# Patient Record
(demographics unavailable — no encounter records)

---

## 2024-10-21 NOTE — CONSULT LETTER
[Courtesy Letter:] : I had the pleasure of seeing your patient, [unfilled], in my office today. [Please see my note below.] : Please see my note below. [Consult Closing:] : Thank you very much for allowing me to participate in the care of this patient.  If you have any questions, please do not hesitate to contact me. [Sincerely,] : Sincerely, [FreeTextEntry2] : Kesha Jimenez MD\par  [address] [place] [address]\[place] [address] [FreeTextEntry3] : Mary Grace Livingston MD, MS Chief, Pediatric Rheumatology [facility]

## 2024-10-21 NOTE — REVIEW OF SYSTEMS
[NI] : Endocrine [Nl] : Hematologic/Lymphatic [Smokers in Home] : there are smokers in the home [FreeTextEntry3] : Post nasal drip [FreeTextEntry2] : See HPI for current status.

## 2024-10-21 NOTE — HISTORY OF PRESENT ILLNESS
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 [Unlimited ADLs] : able to do activities of daily living without limitations [Unlimited Sports] : able to participate in sports without limitations [JIASubtypeDate] : 6/2005 [DurMorningStiffness] : 0 [Ankylosing Spondylitis] : no Ankylosing Spondylitis [Psoriasis] : no Psoriasis [Diabetes Mellitus (type 1 - insulin dependent)] : no Type 1 Diabetes Mellitus [Systemic Lupus Erythematosus] : no Systemic Lupus Erythematosus [Raynaud's Disease] : no Raynaud's Disease [IBD - Crohns] : no Crohn's Inflammatory Bowel disease [IBD - Ulcerative Colitis] : no Ulcerative Colitis Inflammatory Bowel Disease [Graves' Disease] : no Graves' Disease [Hashimoto's Thyroiditis] : no Hashimoto's Thyroiditis [Multiple Sclerosis] : no Multiple Sclerosis [de-identified] : paternal cousin - BLANE; paternal great aunt - RA [FreeTextEntry1] : Oligo-extended BLANE\par  \par  Joint injections - \par      4/18/14 - Rt knee, B/L ankles\par  ...................................................

## 2024-10-21 NOTE — PHYSICAL EXAM
[PERRLA] : WAI [S1, S2 Present] : S1, S2 present [Clear to auscultation] : clear to auscultation [Soft] : soft [NonTender] : non tender [Non Distended] : non distended [Normal Bowel Sounds] : normal bowel sounds [No Hepatosplenomegaly] : no hepatosplenomegaly [No Abnormal Lymph Nodes Palpated] : no abnormal lymph nodes palpated [Refer to Joint Diagram Below] : refer to joint diagram below [Range Of Motion] : full range of motion [Intact Judgement] : intact judgement  [Insight Insight] : intact insight [Hyperextension of knees] : hyperextension of knees [1] : 1 [_______] : Knee: [unfilled]  [Acute distress] : no acute distress [Rash] : no rash [Erythematous Conjunctiva] : nonerythematous conjunctiva [Erythematous Oropharynx] : nonerythematous oropharynx [Lesions] : no lesions [Murmurs] : no murmurs [Joint effusions] : no joint effusions [FreeTextEntry1] : Alert, cheerful, appropriately responsive. [de-identified] : No active arthritis. [NumbJointsActiveArthritis] : 1 [NumbJointsLimitedMotion] : 0 [cJADASscore] : 2 [de-identified] : None

## 2024-10-21 NOTE — IMMUNIZATIONS
[Immunizations are up to date] : Immunizations are up to date [Records maintained by [facility]] : Records maintained by [facility]

## 2024-10-21 NOTE — SOCIAL HISTORY
[Mother] : mother [___ Brothers] : [unfilled] brothers [___ Sisters] : [unfilled] sisters [College] : College [FreeTextEntry1] : [employer]

## 2024-11-25 NOTE — HISTORY OF PRESENT ILLNESS
[de-identified] : 11/25/2024 : ARGELIA LIRA  is a 20 year  old female who presents to the office for follow-up evaluation of her left knee recurrent patellar instability.  She states since the last office visit she has been doing therapy exercises and biking at school and states that she still has pain.  She states when she squats down or kneels down deeply she gets instability episodes.  She states she has had 2 further instability episodes since the last time we have seen her.  She is here for repeat evaluation to discuss options and potential surgery.  She has no other complaints today.  She denies any numbness or tingling distally.  She does have a history of rheumatoid arthritis.  7/29/2024: Argelia is a pleasant 20-year-old female called sooner returns to the office today for evaluation of her left knee recurrent patellar instability.  The patient states that she is feeling better since her last appointment.  She denies any recurrent patellar instability episodes.  She recently had an MRI and comes in to discuss results and any further recommendations.  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  The patient states that she dislocated her left patella after injury 4 years ago.  Her most recent dislocation was this past Sunday.  Since then she has dislocated over 10 times.  She has not had any treatment for this.  She does see an acupuncturist who has been taping her knee.  She has a history of juvenile rheumatoid arthritis was previously on methotrexate for this.  She is not sure what medication she is on now.  She is here today for specialist opinion.  The patient denies any fevers, chills, sweats, recent illnesses, numbness, tingling, weakness, or pain elsewhere at this time.

## 2024-11-25 NOTE — PHYSICAL EXAM
[de-identified] : General: Awake, alert, no acute distress, Patient was cooperative and appropriate during the examination.  The patient is of normal weight for height and age.  Walks without an antalgic gait.   Left knee Examination: Physical examination of the knee demonstrates normal skin without signs of skin changes or abnormalities. No erythema, warmth, or joint effusion is appreciated.    Physiologic valgus of both knees is noted.  No excessive femoral anteversion or tibial torsion is noted.  Sensation is intact to light touch L2-S1 Palpable DP/PT pulse EHL/FHL/TA/GSC motor function intact  Range of Motion 0-130 degrees  Strength Testing Quadriceps/Hamstring 5/5 Patient is able to perform a straight leg raise without difficulty.  Palpation Not tender to palpation about the distal femur or proximal tibia Moderately tender to palpation about the patellofemoral compartment No palpable defect appreciated in the quadriceps or patellar tendons Not tender to palpation of medial joint line Not tender to palpation of lateral joint line  Special Tests Anterior Drawer negative Posterior Drawer negative Lachman Exam negative No Varus or Valgus Laxity at 0 or 30 degrees of knee flexion Yovani's Test negative for pain or crepitus Active compression of the patella positive for pain Translation of the patella 2+ quadrants limited by apprehension [de-identified] : MRI of the left knee from a [facility] imaging center on 7/15/2024 was reviewed today.  No acute pathology is identified.  Patient's TT-TG distance is 1 cm.  X-rays including 3 views of the left knee were obtained in the office on 6/28/2024 and reviewed the patient.  No acute fracture or dislocation.  No significant arthritis.  Very mild lateral patellar tilt.  [place] Giselle ratio is 1.26.

## 2024-11-25 NOTE — DISCUSSION/SUMMARY
[de-identified] : Assessment: 20-year-old female with left knee recurrent patellar instability  I had a long discussion with the patient regarding the nature of her diagnosis, prognosis, and treatment options.  We discussed the risks of no treatment as well as nonoperative and operative treatment modalities.  Nonoperative treatment would include lifestyle modifications, bracing, and anti-inflammatory medications as needed for pain.  Benefits of nonoperative treatment include avoiding the risks associated with surgery.  The risks of nonoperative treatment include further damage to the patient's articular cartilage with repeated instability episodes.  Given the patient's persistent symptomatic instability and desire to return to athletic activities they would like to consider surgical intervention as a more definitive treatment option at this time.    Operative intervention would include a left knee MPFL reconstruction using hamstring allograft and diagnostic knee arthroscopy.  We discussed the various types of autograft and allograft options and the risks and benefits associated with each.  We discussed the risks and benefits associated with the use of allograft tissue in detail including disease transmission. We discussed the risks and benefits of the surgery in detail.  The benefits of surgery include re-stabilizing the patella and protecting the articular cartilage from further damage.  The risks of surgery include but are not limited to infection, blood loss, blood clots, neurovascular injury, stiffness/loss of range of motion, fracture, recurrent instability, persistent pain, painful hardware, progressive arthritis, failure of the graft to heal, re-rupture of the graft, and the need for revision surgery in the future.  I explained to the patient that post-operatively they should expect to be in a knee brace for approximately 4-6 weeks. In the absence of a meniscal repair, the patient will begin weightbearing as tolerated on crutches for 2 weeks after surgery. In the setting of a meniscal repair weightbearing will be restricted for up to 6 weeks after surgery. Physical therapy is expected to start 1 week after surgery although the patient will need to begin range of motion exercises immediately to prevent long-term stiffness. They are expected to actively participate with physical therapy for a minimum of 6 months in order to optimize their surgical outcome. Clearance to return to full activities and/or sports will be determined on an individual basis. On average, most patients will be able to return at 6 months post-op, but some may require more time. Failure to comply with post-operative restrictions and/or failure to participate in physical therapy can lead to surgical failure. The patient verbalizes their understanding of all surgical risks as well as the anticipated post-operative course and would like to proceed with operative intervention.   At this point repeat MRI is indicated due to two recent recurrent dislocation events of her patella that resulted in worsening pain, swelling, and subjective clicking sensations in her kneecap.  This is ordered today.  She will call me once it is done and we will discuss results over the phone.  She may speak with my surgical coordinator now regarding presurgical testing, medical clearance, and scheduling the surgery for her winter break.  They were agreeable to this treatment plan and all of the patient's questions were answered to their satisfaction.  I, Dr. Jang, personally performed the evaluation and management (E/M) services for this established patient who presents today with (a) new problem(s)/exacerbation of (an) existing condition(s).  That E/M includes conducting the clinically appropriate interval history &/or exam, assessing all new/exacerbated conditions, and establishing a new plan of care.  Today, my JOSE, was here to observe my evaluation and management service for this new problem/exacerbated condition and follow the plan of care established by me going forward.

## 2024-12-03 NOTE — DISCUSSION/SUMMARY
[de-identified] : Assessment: 20-year-old female with left knee recurrent patellar instability  I had a long discussion with the patient regarding the nature of her diagnosis, prognosis, and treatment options.  I reviewed the patient's MRI which demonstrates shoulder lateral Hoffa's fat pad edema which in the appropriate clinical setting may represent patellar instability.  I discussed the risks of no treatment as well as nonoperative and operative treatment modalities.  Nonoperative treatment would include lifestyle modifications, bracing, and anti-inflammatory medications as needed for pain.  Benefits of nonoperative treatment include avoiding the risks associated with surgery.  The risks of nonoperative treatment include further damage to the patient's articular cartilage with repeated instability episodes.  Given the patient's persistent symptomatic instability and desire to return to athletic activities they would like to consider surgical intervention as a more definitive treatment option at this time.    Operative intervention would include a left knee MPFL reconstruction using hamstring allograft and diagnostic knee arthroscopy.  We discussed the various types of autograft and allograft options and the risks and benefits associated with each.  We discussed the risks and benefits associated with the use of allograft tissue in detail including disease transmission. We discussed the risks and benefits of the surgery in detail.  The benefits of surgery include re-stabilizing the patella and protecting the articular cartilage from further damage.  The risks of surgery include but are not limited to infection, blood loss, blood clots, neurovascular injury, stiffness/loss of range of motion, fracture, recurrent instability, persistent pain, painful hardware, progressive arthritis, failure of the graft to heal, re-rupture of the graft, and the need for revision surgery in the future.  I explained to the patient that post-operatively they should expect to be in a knee brace for approximately 4-6 weeks. In the absence of a meniscal repair, the patient will begin weightbearing as tolerated on crutches for 2 weeks after surgery. In the setting of a meniscal repair weightbearing will be restricted for up to 6 weeks after surgery. Physical therapy is expected to start 1 week after surgery although the patient will need to begin range of motion exercises immediately to prevent long-term stiffness. They are expected to actively participate with physical therapy for a minimum of 6 months in order to optimize their surgical outcome. Clearance to return to full activities and/or sports will be determined on an individual basis. On average, most patients will be able to return at 6 months post-op, but some may require more time. Failure to comply with post-operative restrictions and/or failure to participate in physical therapy can lead to surgical failure. The patient verbalizes their understanding of all surgical risks as well as the anticipated post-operative course and would like to proceed with operative intervention.   She may speak with my surgical coordinator now regarding presurgical testing, medical clearance, and scheduling the surgery for her winter break.  They were agreeable to this treatment plan and all of the patient's questions were answered to their satisfaction.  I, Dr. Jang, personally performed the evaluation and management (E/M) services for this established patient who presents today with (a) new problem(s)/exacerbation of (an) existing condition(s).  That E/M includes conducting the clinically appropriate interval history &/or exam, assessing all new/exacerbated conditions, and establishing a new plan of care.  Today, my JOSE, was here to observe my evaluation and management service for this new problem/exacerbated condition and follow the plan of care established by me going forward.

## 2024-12-03 NOTE — HISTORY OF PRESENT ILLNESS
[de-identified] : 12/3/2024: Andi is a 20-year-old female returns to the office today for follow-up evaluation of her left knee pain and recurrent instability.  The patient states her symptoms are unchanged since her last appointment.  She recently had repeat MRI and comes in to discuss results and any further recommendations.  The patient denies any fevers, chills, sweats, recent illnesses, numbness, tingling, weakness, or pain elsewhere at this time.  She is here today with her mother.  11/25/2024 : ANDI LIRA  is a 20 year  old female who presents to the office for follow-up evaluation of her left knee recurrent patellar instability.  She states since the last office visit she has been doing therapy exercises and biking at school and states that she still has pain.  She states when she squats down or kneels down deeply she gets instability episodes.  She states she has had 2 further instability episodes since the last time we have seen her.  She is here for repeat evaluation to discuss options and potential surgery.  She has no other complaints today.  She denies any numbness or tingling distally.  She does have a history of rheumatoid arthritis.  7/29/2024: Andi is a pleasant 20-year-old female called sooner returns to the office today for evaluation of her left knee recurrent patellar instability.  The patient states that she is feeling better since her last appointment.  She denies any recurrent patellar instability episodes.  She recently had an MRI and comes in to discuss results and any further recommendations.  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  The patient states that she dislocated her left patella after injury 4 years ago.  Her most recent dislocation was this past Sunday.  Since then she has dislocated over 10 times.  She has not had any treatment for this.  She does see an acupuncturist who has been taping her knee.  She has a history of juvenile rheumatoid arthritis was previously on methotrexate for this.  She is not sure what medication she is on now.  She is here today for specialist opinion.  The patient denies any fevers, chills, sweats, recent illnesses, numbness, tingling, weakness, or pain elsewhere at this time.

## 2024-12-03 NOTE — PHYSICAL EXAM
[de-identified] : General: Awake, alert, no acute distress, Patient was cooperative and appropriate during the examination.  The patient is of normal weight for height and age.  Walks without an antalgic gait.   Left knee Examination: Physical examination of the knee demonstrates normal skin without signs of skin changes or abnormalities. No erythema, warmth, or joint effusion is appreciated.    Physiologic valgus of both knees is noted.  No excessive femoral anteversion or tibial torsion is noted.  Sensation is intact to light touch L2-S1 Palpable DP/PT pulse EHL/FHL/TA/GSC motor function intact  Range of Motion 0-130 degrees  Strength Testing Quadriceps/Hamstring 5/5 Patient is able to perform a straight leg raise without difficulty.  Palpation Not tender to palpation about the distal femur or proximal tibia Moderately tender to palpation about the patellofemoral compartment No palpable defect appreciated in the quadriceps or patellar tendons Not tender to palpation of medial joint line Not tender to palpation of lateral joint line  Special Tests Anterior Drawer negative Posterior Drawer negative Lachman Exam negative No Varus or Valgus Laxity at 0 or 30 degrees of knee flexion Yovani's Test negative for pain or crepitus Active compression of the patella positive for pain Translation of the patella 2+ quadrants limited by apprehension [de-identified] : MRI of the left knee.  [facility] on 12/1/[facility]24 was reviewed with patient today in the office: -Minimal superior-lateral Hoffa's fat edema, which may represent patellar instability in the appropriate clinical setting.  MRI of the left knee from a [facility] center on 7/15/2024 was reviewed today.  No acute pathology is identified.  Patient's TT-TG distance is 1 cm.  X-rays including 3 views of the left knee were obtained in the office on 6/28/2024 and reviewed the patient.  No acute fracture or dislocation.  No significant arthritis.  Very mild lateral patellar tilt.  [place] Giselle ratio is 1.26.

## 2024-12-23 NOTE — HISTORY OF PRESENT ILLNESS
[___ Weeks Post Op] : [unfilled] weeks post op [de-identified] : DOS: 12/12/2024 s/p left knee MPFL reconstruction with hamstring allograft and diagnostic arthroscopy [de-identified] : Argelia is a 20-year-old female returns to the office today status post left knee MPFL reconstruction.  The patient states that she is doing better and has had improvements in her pain and swelling since the time of surgery.  She has been compliant with her restrictions.  She is bearing weight with use of her brace and crutches.  She has not started physical therapy yet.  She is here today for routine follow-up.  She denies any fevers, chills, sweats, or pain elsewhere. [de-identified] : On exam, the patient is pleasant.  They  are awake, alert, and oriented x3.  The patient walks with her brace and on crutches.   Left lower Extremity The patient's incision (s) well-approximated and healing well.  The incisions were cleaned with alcohol and Steri-Strips were applied.  The patient tolerated this well. No erythema, warmth, or drainage. There is a mild postoperative effusion.  No bony tenderness to palpation about the knee. Range of motion: 0 to 30 degrees.  No apprehension with lateral translation of the patella.  Anterior drawer negative, Posterior Drawer negative, Lachman negative. No varus or valgus laxity at 0 or 30 degrees of knee flexion.  EHL/FHL/TA/GSC motor function is intact, sensations intact light touch in L2-S1 distributions, DP/PT pulses are palpable, compartments are soft and compressible, there is no calf tenderness to palpation bilaterally. [de-identified] : Status post left knee MPFL reconstruction using hamstring allograft and diagnostic arthroscopy on 12/12/2024 [de-identified] : Argelia is recovering well from her surgery.  She has had improvements in pain and swelling since the time of surgery.  At this point she may progress to weight-bear as tolerated and wean off the crutches over the next 1 to 2 weeks.  She will continue to ambulate with the brace locked in extension.  She will begin gentle progressive range of motion exercises with physical therapy with a goal of achieving but not exceeding 90 degrees of knee flexion by 6 weeks postop.  She may continue with Tylenol or Motrin as needed for pain.  She will avoid any high impact activities or sports at this time.  She will follow-up in 4 weeks for repeat clinical assessment.  The patient and her father verbalized understanding and agree with the plan.  All questions answered today satisfaction

## 2025-01-24 NOTE — HISTORY OF PRESENT ILLNESS
[___ Weeks Post Op] : [unfilled] weeks post op [de-identified] : DOS: 12/12/2024 s/p left knee MPFL reconstruction with hamstring allograft and diagnostic arthroscopy [de-identified] : Argelia is a 20-year-old female returns to the office today status post left knee MPFL reconstruction.  The patient states she has been compliant with her restrictions and her range of motion and pain is improving.  She states she has gotten her knee to 90 degrees with physical therapy however with pain.  She denies any recurrent instability episodes.  She is here for specialist follow-up.  She denies any numbness or tingling distally.  She has no other complaints today.  She is here with her father today. [de-identified] : On exam, the patient is pleasant.  They  are awake, alert, and oriented x3.  The patient walks with her brace and on crutches.   Left lower Extremity The patient's incisions are well-healed and benign appearance.. There is a mild postoperative effusion.  No bony tenderness to palpation about the knee. Range of motion: 0 to 75 degrees.  No apprehension with lateral translation of the patella.  Anterior drawer negative, Posterior Drawer negative, Lachman negative. No varus or valgus laxity at 0 or 30 degrees of knee flexion.  EHL/FHL/TA/GSC motor function is intact, sensations intact light touch in L2-S1 distributions, DP/PT pulses are palpable, compartments are soft and compressible, there is no calf tenderness to palpation bilaterally. [de-identified] : Status post left knee MPFL reconstruction using hamstring allograft and diagnostic arthroscopy on 12/12/2024 [de-identified] : Argelia is recovering well from her surgery.  She has had improvements in pain and swelling since the time of surgery.  At this time she will continue with physical therapy both formally on her own to progress her range of motion and her weightbearing status.  She can weight-bear as tolerated with a knee brace unlocked and gradually wean from the crutches and then subsequently the brace with the guidance of physical therapy.  She will avoid gym and sport related activities and will avoid high-impact activities and will continue to follow the postoperative protocol previously provided for gentle strengthening.  She will follow-up in 6 weeks for repeat evaluation to reassess.  Patient understands and agrees and all questions were answered.   I, Dr. Jang, personally performed the evaluation and management (E/M) services for this established patient who presents today with (a) new problem(s)/exacerbation of (an) existing condition(s).  That E/M includes conducting the clinically appropriate interval history &/or exam, assessing all new/exacerbated conditions, and establishing a new plan of care.  Today, my JOSE, was here to observe my evaluation and management service for this new problem/exacerbated condition and follow the plan of care established by me going forward.

## 2025-02-03 NOTE — HISTORY OF PRESENT ILLNESS
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 [Unlimited ADLs] : able to do activities of daily living without limitations [Unlimited Sports] : able to participate in sports without limitations [0.5] : 0.5 [JIASubtypeDate] : 6/2005 [DurMorningStiffness] : 0 [Ankylosing Spondylitis] : no Ankylosing Spondylitis [Psoriasis] : no Psoriasis [Diabetes Mellitus (type 1 - insulin dependent)] : no Type 1 Diabetes Mellitus [Systemic Lupus Erythematosus] : no Systemic Lupus Erythematosus [Raynaud's Disease] : no Raynaud's Disease [IBD - Crohns] : no Crohn's Inflammatory Bowel disease [IBD - Ulcerative Colitis] : no Ulcerative Colitis Inflammatory Bowel Disease [Graves' Disease] : no Graves' Disease [Hashimoto's Thyroiditis] : no Hashimoto's Thyroiditis [Multiple Sclerosis] : no Multiple Sclerosis [de-identified] : paternal cousin - BLANE; paternal great aunt - RA [FreeTextEntry1] : Oligo-extended BLANE\par  \par  Joint injections - \par      4/18/14 - Rt knee, B/L ankles\par  ...................................................

## 2025-02-03 NOTE — CONSULT LETTER
[Courtesy Letter:] : I had the pleasure of seeing your patient, [unfilled], in my office today. [Please see my note below.] : Please see my note below. [Consult Closing:] : Thank you very much for allowing me to participate in the care of this patient.  If you have any questions, please do not hesitate to contact me. [Sincerely,] : Sincerely, [FreeTextEntry2] : Kesha Jimenez MD\par  [address] [place] [address]\[place] [address] [FreeTextEntry3] : Mary Grace Livingston MD, MS Chief, Pediatric Rheumatology [facility]

## 2025-02-03 NOTE — PHYSICAL EXAM
[PERRLA] : WAI [S1, S2 Present] : S1, S2 present [Clear to auscultation] : clear to auscultation [Soft] : soft [NonTender] : non tender [Non Distended] : non distended [Normal Bowel Sounds] : normal bowel sounds [No Hepatosplenomegaly] : no hepatosplenomegaly [No Abnormal Lymph Nodes Palpated] : no abnormal lymph nodes palpated [Refer to Joint Diagram Below] : refer to joint diagram below [Range Of Motion] : full range of motion [Intact Judgement] : intact judgement  [Insight Insight] : intact insight [1] : 1 [_______] : Knee: [unfilled]  [Hyperextension of knees] : hyperextension of knees [Acute distress] : no acute distress [Rash] : no rash [Erythematous Conjunctiva] : nonerythematous conjunctiva [Erythematous Oropharynx] : nonerythematous oropharynx [Lesions] : no lesions [Murmurs] : no murmurs [Joint effusions] : no joint effusions [FreeTextEntry1] : Alert, cheerful, appropriately responsive. [de-identified] : No active arthritis. [NumbJointsActiveArthritis] : 1 [NumbJointsLimitedMotion] : 0 [cJADASscore] : 2 [de-identified] : None

## 2025-03-10 NOTE — HISTORY OF PRESENT ILLNESS
[___ Weeks Post Op] : [unfilled] weeks post op [de-identified] : DOS: 12/12/2024 s/p left knee MPFL reconstruction with hamstring allograft and diagnostic arthroscopy [de-identified] : Argelia is a 21-year-old female returns to the office today status post left knee MPFL reconstruction.  The patient states that she has been doing very well and has had improvements in her range of motion since her last appointment.  She has been compliant with her restrictions and going to physical therapy.  Unfortunately, she had to take the rest of the semester off due to medical leave but is pleased with her progress.  She is here today for routine follow-up.  She denies any fevers, chills, sweats, or pain elsewhere. [de-identified] : On exam, the patient is pleasant.  They  are awake, alert, and oriented x3.  The patient walks with no assistive devices.   Left lower Extremity The patient's incision (s) well-healed. No erythema, warmth, or drainage. There is no effusion.  No bony tenderness to palpation about the knee. Range of motion: 0 to 130 degrees, symmetric with contralateral side.  Lateral translation of the patella 1+ quadrant with no apprehension or pain.  Anterior drawer negative, Posterior Drawer negative, Lachman negative. No varus or valgus laxity at 0 or 30 degrees of knee flexion.  EHL/FHL/TA/GSC motor function is intact, sensations intact light touch in L2-S1 distributions, DP/PT pulses are palpable, compartments are soft and compressible, there is no calf tenderness to palpation bilaterally. [de-identified] : 21-year-old female status post left knee MPFL reconstruction using hamstring allograft and diagnostic arthroscopy on 12/12/2024 [de-identified] : Argelia is recovering very well from her surgery.  She has had substantial improvements in her range of motion and discomfort since her last appointment.  At this time she may continue to ambulate without any assistive devices.  She will continue with physical therapy now focusing on strengthening and conditioning exercises and a new referral was provided today.  She will avoid any running, jumping, or sports.  She will follow-up in 3 months for repeat clinical assessment.  The patient verbalizes her understanding and agrees with the plan.  All questions were answered her satisfaction.  I, Dr. Jang, personally performed the evaluation and management (E/M) services for this established patient who presents today with (a) new problem(s)/exacerbation of (an) existing condition(s).  That E/M includes conducting the clinically appropriate interval history &/or exam, assessing all new/exacerbated conditions, and establishing a new plan of care.  Today, my JOSE, was here to observe my evaluation and management service for this new problem/exacerbated condition and follow the plan of care established by me going forward.

## 2025-04-08 NOTE — CONSULT LETTER
[Courtesy Letter:] : I had the pleasure of seeing your patient, [unfilled], in my office today. [Please see my note below.] : Please see my note below. [Consult Closing:] : Thank you very much for allowing me to participate in the care of this patient.  If you have any questions, please do not hesitate to contact me. [Sincerely,] : Sincerely, [FreeTextEntry2] : Kesha Jimenez MD\par  [address] [place] [address]\[place] [address] [FreeTextEntry3] : Mary Grace Livingston MD, MS Chief, Pediatric Rheumatology [facility]

## 2025-04-08 NOTE — CONSULT LETTER
[Courtesy Letter:] : I had the pleasure of seeing your patient, [unfilled], in my office today. [Please see my note below.] : Please see my note below. [Consult Closing:] : Thank you very much for allowing me to participate in the care of this patient.  If you have any questions, please do not hesitate to contact me. [Sincerely,] : Sincerely, [FreeTextEntry2] : Kesha Jimenez MD\par  [address] [place] [address]\[place] [address] [FreeTextEntry3] : Mary Grace Livingston MD, MS Chief, Pediatric Rheumatology [facility]

## 2025-04-08 NOTE — PHYSICAL EXAM
[PERRLA] : WAI [S1, S2 Present] : S1, S2 present [Clear to auscultation] : clear to auscultation [Soft] : soft [NonTender] : non tender [Non Distended] : non distended [Normal Bowel Sounds] : normal bowel sounds [No Hepatosplenomegaly] : no hepatosplenomegaly [No Abnormal Lymph Nodes Palpated] : no abnormal lymph nodes palpated [Refer to Joint Diagram Below] : refer to joint diagram below [Range Of Motion] : full range of motion [Intact Judgement] : intact judgement  [Insight Insight] : intact insight [1] : 1 [Hyperextension of knees] : hyperextension of knees [2] : 2 [_______] : Knee: [unfilled] [Acute distress] : no acute distress [Rash] : no rash [Erythematous Conjunctiva] : nonerythematous conjunctiva [Erythematous Oropharynx] : nonerythematous oropharynx [Lesions] : no lesions [Murmurs] : no murmurs [Joint effusions] : no joint effusions [FreeTextEntry1] : Alert, cheerful, appropriately responsive. [de-identified] : No active arthritis. [NumbJointsActiveArthritis] : 2 [NumbJointsLimitedMotion] : 2 [ADASscore] : 4 [de-identified] : None

## 2025-04-08 NOTE — HISTORY OF PRESENT ILLNESS
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 [Unlimited ADLs] : able to do activities of daily living without limitations [Unlimited Sports] : able to participate in sports without limitations [JIASubtypeDate] : 6/2005 [DurMorningStiffness] : 0 [Ankylosing Spondylitis] : no Ankylosing Spondylitis [Psoriasis] : no Psoriasis [Diabetes Mellitus (type 1 - insulin dependent)] : no Type 1 Diabetes Mellitus [Systemic Lupus Erythematosus] : no Systemic Lupus Erythematosus [Raynaud's Disease] : no Raynaud's Disease [IBD - Crohns] : no Crohn's Inflammatory Bowel disease [IBD - Ulcerative Colitis] : no Ulcerative Colitis Inflammatory Bowel Disease [Graves' Disease] : no Graves' Disease [Hashimoto's Thyroiditis] : no Hashimoto's Thyroiditis [Multiple Sclerosis] : no Multiple Sclerosis [de-identified] : paternal cousin - BLANE; paternal great aunt - RA [FreeTextEntry1] : Oligo-extended BLANE\par  \par  Joint injections - \par      4/18/14 - Rt knee, B/L ankles\par  ...................................................

## 2025-04-08 NOTE — CONSULT LETTER
[Courtesy Letter:] : I had the pleasure of seeing your patient, [unfilled], in my office today. [Please see my note below.] : Please see my note below. [Consult Closing:] : Thank you very much for allowing me to participate in the care of this patient.  If you have any questions, please do not hesitate to contact me. [Sincerely,] : Sincerely, [FreeTextEntry2] : Kesha Jimenez MD\par  [address] [place] [address]\[place] [address] [FreeTextEntry3] : Mary Grace Livingston MD, MS Chief, Pediatric Rheumatology [facility]

## 2025-04-08 NOTE — HISTORY OF PRESENT ILLNESS
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 [Unlimited ADLs] : able to do activities of daily living without limitations [Unlimited Sports] : able to participate in sports without limitations [JIASubtypeDate] : 6/2005 [DurMorningStiffness] : 0 [Ankylosing Spondylitis] : no Ankylosing Spondylitis [Psoriasis] : no Psoriasis [Diabetes Mellitus (type 1 - insulin dependent)] : no Type 1 Diabetes Mellitus [Systemic Lupus Erythematosus] : no Systemic Lupus Erythematosus [Raynaud's Disease] : no Raynaud's Disease [IBD - Crohns] : no Crohn's Inflammatory Bowel disease [IBD - Ulcerative Colitis] : no Ulcerative Colitis Inflammatory Bowel Disease [Graves' Disease] : no Graves' Disease [Hashimoto's Thyroiditis] : no Hashimoto's Thyroiditis [Multiple Sclerosis] : no Multiple Sclerosis [de-identified] : paternal cousin - BLANE; paternal great aunt - RA [FreeTextEntry1] : Oligo-extended BLANE\par  \par  Joint injections - \par      4/18/14 - Rt knee, B/L ankles\par  ...................................................

## 2025-04-08 NOTE — PHYSICAL EXAM
[PERRLA] : WAI [S1, S2 Present] : S1, S2 present [Clear to auscultation] : clear to auscultation [Soft] : soft [NonTender] : non tender [Non Distended] : non distended [Normal Bowel Sounds] : normal bowel sounds [No Hepatosplenomegaly] : no hepatosplenomegaly [No Abnormal Lymph Nodes Palpated] : no abnormal lymph nodes palpated [Refer to Joint Diagram Below] : refer to joint diagram below [Range Of Motion] : full range of motion [Intact Judgement] : intact judgement  [Insight Insight] : intact insight [1] : 1 [Hyperextension of knees] : hyperextension of knees [2] : 2 [_______] : Knee: [unfilled] [Acute distress] : no acute distress [Rash] : no rash [Erythematous Conjunctiva] : nonerythematous conjunctiva [Erythematous Oropharynx] : nonerythematous oropharynx [Lesions] : no lesions [Murmurs] : no murmurs [Joint effusions] : no joint effusions [FreeTextEntry1] : Alert, cheerful, appropriately responsive. [de-identified] : No active arthritis. [NumbJointsActiveArthritis] : 2 [NumbJointsLimitedMotion] : 2 [ADASscore] : 4 [de-identified] : None

## 2025-04-08 NOTE — HISTORY OF PRESENT ILLNESS
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 [Unlimited ADLs] : able to do activities of daily living without limitations [Unlimited Sports] : able to participate in sports without limitations [JIASubtypeDate] : 6/2005 [DurMorningStiffness] : 0 [Ankylosing Spondylitis] : no Ankylosing Spondylitis [Psoriasis] : no Psoriasis [Diabetes Mellitus (type 1 - insulin dependent)] : no Type 1 Diabetes Mellitus [Systemic Lupus Erythematosus] : no Systemic Lupus Erythematosus [Raynaud's Disease] : no Raynaud's Disease [IBD - Crohns] : no Crohn's Inflammatory Bowel disease [IBD - Ulcerative Colitis] : no Ulcerative Colitis Inflammatory Bowel Disease [Graves' Disease] : no Graves' Disease [Hashimoto's Thyroiditis] : no Hashimoto's Thyroiditis [Multiple Sclerosis] : no Multiple Sclerosis [de-identified] : paternal cousin - BLANE; paternal great aunt - RA [FreeTextEntry1] : Oligo-extended BLANE\par  \par  Joint injections - \par      4/18/14 - Rt knee, B/L ankles\par  ...................................................

## 2025-04-08 NOTE — PHYSICAL EXAM
[PERRLA] : WAI [S1, S2 Present] : S1, S2 present [Clear to auscultation] : clear to auscultation [Soft] : soft [NonTender] : non tender [Non Distended] : non distended [Normal Bowel Sounds] : normal bowel sounds [No Hepatosplenomegaly] : no hepatosplenomegaly [No Abnormal Lymph Nodes Palpated] : no abnormal lymph nodes palpated [Refer to Joint Diagram Below] : refer to joint diagram below [Range Of Motion] : full range of motion [Intact Judgement] : intact judgement  [Insight Insight] : intact insight [1] : 1 [Hyperextension of knees] : hyperextension of knees [2] : 2 [_______] : Knee: [unfilled] [Acute distress] : no acute distress [Rash] : no rash [Erythematous Conjunctiva] : nonerythematous conjunctiva [Erythematous Oropharynx] : nonerythematous oropharynx [Lesions] : no lesions [Murmurs] : no murmurs [Joint effusions] : no joint effusions [FreeTextEntry1] : Alert, cheerful, appropriately responsive. [de-identified] : No active arthritis. [NumbJointsActiveArthritis] : 2 [NumbJointsLimitedMotion] : 2 [ADASscore] : 4 [de-identified] : None

## 2025-05-21 NOTE — HISTORY OF PRESENT ILLNESS
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 [Unlimited ADLs] : able to do activities of daily living without limitations [Unlimited Sports] : able to participate in sports without limitations [JIASubtypeDate] : 6/2005 [DurMorningStiffness] : 0 [Ankylosing Spondylitis] : no Ankylosing Spondylitis [Psoriasis] : no Psoriasis [Diabetes Mellitus (type 1 - insulin dependent)] : no Type 1 Diabetes Mellitus [Systemic Lupus Erythematosus] : no Systemic Lupus Erythematosus [Raynaud's Disease] : no Raynaud's Disease [IBD - Crohns] : no Crohn's Inflammatory Bowel disease [IBD - Ulcerative Colitis] : no Ulcerative Colitis Inflammatory Bowel Disease [Graves' Disease] : no Graves' Disease [Hashimoto's Thyroiditis] : no Hashimoto's Thyroiditis [Multiple Sclerosis] : no Multiple Sclerosis [de-identified] : paternal cousin - BLANE; paternal great aunt - RA [FreeTextEntry1] : Oligo-extended BLANE\par  \par  Joint injections - \par      4/18/14 - Rt knee, B/L ankles\par  ...................................................

## 2025-05-21 NOTE — CONSULT LETTER
[Courtesy Letter:] : I had the pleasure of seeing your patient, [unfilled], in my office today. [Please see my note below.] : Please see my note below. [Consult Closing:] : Thank you very much for allowing me to participate in the care of this patient.  If you have any questions, please do not hesitate to contact me. [Sincerely,] : Sincerely, [FreeTextEntry2] : Kesha Jimenez MD\par  [address] [place] [address]\[place] [address] [FreeTextEntry3] : Mary Grace Livnigston MD, MS Chief, Pediatric Rheumatology [facility]

## 2025-05-21 NOTE — HISTORY OF PRESENT ILLNESS
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 [Unlimited ADLs] : able to do activities of daily living without limitations [Unlimited Sports] : able to participate in sports without limitations [JIASubtypeDate] : 6/2005 [DurMorningStiffness] : 0 [Ankylosing Spondylitis] : no Ankylosing Spondylitis [Psoriasis] : no Psoriasis [Diabetes Mellitus (type 1 - insulin dependent)] : no Type 1 Diabetes Mellitus [Systemic Lupus Erythematosus] : no Systemic Lupus Erythematosus [Raynaud's Disease] : no Raynaud's Disease [IBD - Crohns] : no Crohn's Inflammatory Bowel disease [IBD - Ulcerative Colitis] : no Ulcerative Colitis Inflammatory Bowel Disease [Graves' Disease] : no Graves' Disease [Hashimoto's Thyroiditis] : no Hashimoto's Thyroiditis [Multiple Sclerosis] : no Multiple Sclerosis [de-identified] : paternal cousin - BLANE; paternal great aunt - RA [FreeTextEntry1] : Oligo-extended BLANE\par  \par  Joint injections - \par      4/18/14 - Rt knee, B/L ankles\par  ...................................................

## 2025-05-21 NOTE — PHYSICAL EXAM
[PERRLA] : WAI [S1, S2 Present] : S1, S2 present [Clear to auscultation] : clear to auscultation [Soft] : soft [NonTender] : non tender [Non Distended] : non distended [Normal Bowel Sounds] : normal bowel sounds [No Hepatosplenomegaly] : no hepatosplenomegaly [No Abnormal Lymph Nodes Palpated] : no abnormal lymph nodes palpated [Refer to Joint Diagram Below] : refer to joint diagram below [Range Of Motion] : full range of motion [Intact Judgement] : intact judgement  [Insight Insight] : intact insight [2] : 2 [_______] : Knee: [unfilled] [Hyperextension of knees] : hyperextension of knees [ADASscore] : 4 [Acute distress] : no acute distress [Rash] : no rash [Erythematous Conjunctiva] : nonerythematous conjunctiva [Erythematous Oropharynx] : nonerythematous oropharynx [Lesions] : no lesions [Murmurs] : no murmurs [Joint effusions] : no joint effusions [FreeTextEntry1] : Alert, cheerful, appropriately responsive. [de-identified] : No active arthritis. [NumbJointsActiveArthritis] : 2 [NumbJointsLimitedMotion] : 2 [de-identified] : None

## 2025-05-21 NOTE — CONSULT LETTER
[Courtesy Letter:] : I had the pleasure of seeing your patient, [unfilled], in my office today. [Please see my note below.] : Please see my note below. [Consult Closing:] : Thank you very much for allowing me to participate in the care of this patient.  If you have any questions, please do not hesitate to contact me. [Sincerely,] : Sincerely, [FreeTextEntry2] : Kesha Jimenez MD\par  [address] [place] [address]\[place] [address] [FreeTextEntry3] : Mary Grace Livingsotn MD, MS Chief, Pediatric Rheumatology [facility]

## 2025-05-21 NOTE — PHYSICAL EXAM
[PERRLA] : WAI [S1, S2 Present] : S1, S2 present [Clear to auscultation] : clear to auscultation [Soft] : soft [NonTender] : non tender [Non Distended] : non distended [Normal Bowel Sounds] : normal bowel sounds [No Hepatosplenomegaly] : no hepatosplenomegaly [No Abnormal Lymph Nodes Palpated] : no abnormal lymph nodes palpated [Refer to Joint Diagram Below] : refer to joint diagram below [Range Of Motion] : full range of motion [Intact Judgement] : intact judgement  [Insight Insight] : intact insight [2] : 2 [_______] : Knee: [unfilled] [Hyperextension of knees] : hyperextension of knees [ADASscore] : 4 [Acute distress] : no acute distress [Rash] : no rash [Erythematous Conjunctiva] : nonerythematous conjunctiva [Erythematous Oropharynx] : nonerythematous oropharynx [Lesions] : no lesions [Murmurs] : no murmurs [Joint effusions] : no joint effusions [FreeTextEntry1] : Alert, cheerful, appropriately responsive. [de-identified] : No active arthritis. [NumbJointsActiveArthritis] : 2 [NumbJointsLimitedMotion] : 2 [de-identified] : None

## 2025-07-16 NOTE — HISTORY OF PRESENT ILLNESS
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 [Unlimited ADLs] : able to do activities of daily living without limitations [Unlimited Sports] : able to participate in sports without limitations [JIASubtypeDate] : 6/2005 [DurMorningStiffness] : 0 [Ankylosing Spondylitis] : no Ankylosing Spondylitis [Psoriasis] : no Psoriasis [Diabetes Mellitus (type 1 - insulin dependent)] : no Type 1 Diabetes Mellitus [Systemic Lupus Erythematosus] : no Systemic Lupus Erythematosus [Raynaud's Disease] : no Raynaud's Disease [IBD - Crohns] : no Crohn's Inflammatory Bowel disease [IBD - Ulcerative Colitis] : no Ulcerative Colitis Inflammatory Bowel Disease [Graves' Disease] : no Graves' Disease [Hashimoto's Thyroiditis] : no Hashimoto's Thyroiditis [Multiple Sclerosis] : no Multiple Sclerosis [de-identified] : paternal cousin - BLANE; paternal great aunt - RA [FreeTextEntry1] : Oligo-extended BLANE\par  \par  Joint injections - \par      4/18/14 - Rt knee, B/L ankles\par  ...................................................

## 2025-07-16 NOTE — CONSULT LETTER
[Courtesy Letter:] : I had the pleasure of seeing your patient, [unfilled], in my office today. [Please see my note below.] : Please see my note below. [Consult Closing:] : Thank you very much for allowing me to participate in the care of this patient.  If you have any questions, please do not hesitate to contact me. [Sincerely,] : Sincerely, [FreeTextEntry2] : Kesha Jimenez MD\par  [address] [place] [address]\[place] [address] [FreeTextEntry3] : Mary Grace Livingston MD, MS Chief, Pediatric Rheumatology [facility]

## 2025-07-16 NOTE — PHYSICAL EXAM
[PERRLA] : WAI [S1, S2 Present] : S1, S2 present [Clear to auscultation] : clear to auscultation [Soft] : soft [NonTender] : non tender [Non Distended] : non distended [Normal Bowel Sounds] : normal bowel sounds [No Hepatosplenomegaly] : no hepatosplenomegaly [No Abnormal Lymph Nodes Palpated] : no abnormal lymph nodes palpated [Refer to Joint Diagram Below] : refer to joint diagram below [Range Of Motion] : full range of motion [Intact Judgement] : intact judgement  [Insight Insight] : intact insight [2] : 2 [_______] : Knee: [unfilled] [Hyperextension of knees] : hyperextension of knees [Acute distress] : no acute distress [Rash] : no rash [Erythematous Conjunctiva] : nonerythematous conjunctiva [Erythematous Oropharynx] : nonerythematous oropharynx [Lesions] : no lesions [Murmurs] : no murmurs [Joint effusions] : no joint effusions [FreeTextEntry1] : Alert, cheerful, appropriately responsive. [de-identified] : No active arthritis. [NumbJointsActiveArthritis] : 1 [NumbJointsLimitedMotion] : 1 [de-identified] : None

## 2025-07-16 NOTE — HISTORY OF PRESENT ILLNESS
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 [Unlimited ADLs] : able to do activities of daily living without limitations [Unlimited Sports] : able to participate in sports without limitations [JIASubtypeDate] : 6/2005 [DurMorningStiffness] : 0 [Ankylosing Spondylitis] : no Ankylosing Spondylitis [Psoriasis] : no Psoriasis [Diabetes Mellitus (type 1 - insulin dependent)] : no Type 1 Diabetes Mellitus [Systemic Lupus Erythematosus] : no Systemic Lupus Erythematosus [Raynaud's Disease] : no Raynaud's Disease [IBD - Crohns] : no Crohn's Inflammatory Bowel disease [IBD - Ulcerative Colitis] : no Ulcerative Colitis Inflammatory Bowel Disease [Graves' Disease] : no Graves' Disease [Hashimoto's Thyroiditis] : no Hashimoto's Thyroiditis [Multiple Sclerosis] : no Multiple Sclerosis [de-identified] : paternal cousin - BLANE; paternal great aunt - RA [FreeTextEntry1] : Oligo-extended BLANE\par  \par  Joint injections - \par      4/18/14 - Rt knee, B/L ankles\par  ...................................................

## 2025-07-16 NOTE — CONSULT LETTER
[Courtesy Letter:] : I had the pleasure of seeing your patient, [unfilled], in my office today. [Please see my note below.] : Please see my note below. [Consult Closing:] : Thank you very much for allowing me to participate in the care of this patient.  If you have any questions, please do not hesitate to contact me. [Sincerely,] : Sincerely, [FreeTextEntry2] : Kesha Jimenez MD\par  [address] [place] [address]\[place] [address] [FreeTextEntry3] : Mary Grace Livingston MD, MS Chief, Pediatric Rheumatology [facility]

## 2025-07-16 NOTE — PHYSICAL EXAM
[PERRLA] : WAI [S1, S2 Present] : S1, S2 present [Clear to auscultation] : clear to auscultation [Soft] : soft [NonTender] : non tender [Non Distended] : non distended [Normal Bowel Sounds] : normal bowel sounds [No Hepatosplenomegaly] : no hepatosplenomegaly [No Abnormal Lymph Nodes Palpated] : no abnormal lymph nodes palpated [Refer to Joint Diagram Below] : refer to joint diagram below [Range Of Motion] : full range of motion [Intact Judgement] : intact judgement  [Insight Insight] : intact insight [2] : 2 [_______] : Knee: [unfilled] [Hyperextension of knees] : hyperextension of knees [Acute distress] : no acute distress [Rash] : no rash [Erythematous Conjunctiva] : nonerythematous conjunctiva [Erythematous Oropharynx] : nonerythematous oropharynx [Lesions] : no lesions [Murmurs] : no murmurs [Joint effusions] : no joint effusions [FreeTextEntry1] : Alert, cheerful, appropriately responsive. [de-identified] : No active arthritis. [NumbJointsActiveArthritis] : 1 [NumbJointsLimitedMotion] : 1 [de-identified] : None

## 2025-07-28 NOTE — PROCEDURE
[Today's Date:] : Date: [unfilled] [Benefits] : benefits [Alternatives] : alternatives [Consent Obtained] : written consent was obtained prior to the procedure and is detailed in the patient's record [Patient] : Prior to the start of the procedure a time out was taken and the identity of the patient was confirmed via name and date of birth with the patient. The correct site and the procedure to be performed were confirmed. The correct side was confirmed if applicable. The availability of the correct equipment was verified [Diagnostic] : diagnostic  [#1 Site: ______] : #1 site identified in the [unfilled] [Chlorhexidine] : chlorhexidine [22 gauge 1.5 inch] : A 22 gauge 1.5 inch needle was used [Kenolog ___mg] : [unfilled] mg of kenolog [Tolerated Well] : The patient tolerated the procedure well [No Complications] : There were no complications [Instructions Given] : Handouts/patient instructions were given to patient [Patient Instructed to Call] : Patient was instructed to call if redness at site, a decrease in range of motion or an increase in pain is noted after procedure. [#2 Site: ___] : # 2 site identified in the [unfilled] [de-identified] : lot # [id number], exp 11/2026

## 2025-07-28 NOTE — REASON FOR VISIT
[Procedure: _________] : [unfilled] is  being seen for a [unfilled] procedure visit [FreeTextEntry1] : planned for right knee injection today.  on exam left knee effusion is larger and Argelia whould like that knee injected also.  [Patient] : patient [Mother] : mother [Medical Records] : medical records